# Patient Record
Sex: FEMALE | Race: BLACK OR AFRICAN AMERICAN
[De-identification: names, ages, dates, MRNs, and addresses within clinical notes are randomized per-mention and may not be internally consistent; named-entity substitution may affect disease eponyms.]

---

## 2020-08-09 ENCOUNTER — HOSPITAL ENCOUNTER (EMERGENCY)
Dept: HOSPITAL 97 - ER | Age: 40
Discharge: ANOTHER HOSPITAL | End: 2020-08-09
Payer: SELF-PAY

## 2020-08-09 VITALS — SYSTOLIC BLOOD PRESSURE: 121 MMHG | OXYGEN SATURATION: 99 % | DIASTOLIC BLOOD PRESSURE: 68 MMHG

## 2020-08-09 VITALS — TEMPERATURE: 98 F

## 2020-08-09 DIAGNOSIS — S22.49XA: ICD-10-CM

## 2020-08-09 DIAGNOSIS — Z23: ICD-10-CM

## 2020-08-09 DIAGNOSIS — J93.9: Primary | ICD-10-CM

## 2020-08-09 DIAGNOSIS — S81.812A: ICD-10-CM
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
## 2020-08-09 LAB
BUN BLD-MCNC: 13 MG/DL (ref 7–18)
GLUCOSE SERPLBLD-MCNC: 157 MG/DL (ref 74–106)
HCT VFR BLD CALC: 39.4 % (ref 36–45)
LYMPHOCYTES # SPEC AUTO: 3 K/UL (ref 0.7–4.9)
PMV BLD: 7 FL (ref 7.6–11.3)
POTASSIUM SERPL-SCNC: 4.1 MMOL/L (ref 3.5–5.1)
RBC # BLD: 4.37 M/UL (ref 3.86–4.86)

## 2020-08-09 PROCEDURE — 96360 HYDRATION IV INFUSION INIT: CPT

## 2020-08-09 PROCEDURE — 0JQP0ZZ REPAIR LEFT LOWER LEG SUBCUTANEOUS TISSUE AND FASCIA, OPEN APPROACH: ICD-10-PCS

## 2020-08-09 PROCEDURE — 72170 X-RAY EXAM OF PELVIS: CPT

## 2020-08-09 PROCEDURE — 82565 ASSAY OF CREATININE: CPT

## 2020-08-09 PROCEDURE — 70450 CT HEAD/BRAIN W/O DYE: CPT

## 2020-08-09 PROCEDURE — 99285 EMERGENCY DEPT VISIT HI MDM: CPT

## 2020-08-09 PROCEDURE — 71260 CT THORAX DX C+: CPT

## 2020-08-09 PROCEDURE — 90471 IMMUNIZATION ADMIN: CPT

## 2020-08-09 PROCEDURE — 74177 CT ABD & PELVIS W/CONTRAST: CPT

## 2020-08-09 PROCEDURE — 72125 CT NECK SPINE W/O DYE: CPT

## 2020-08-09 PROCEDURE — 71045 X-RAY EXAM CHEST 1 VIEW: CPT

## 2020-08-09 PROCEDURE — 36415 COLL VENOUS BLD VENIPUNCTURE: CPT

## 2020-08-09 PROCEDURE — 85025 COMPLETE CBC W/AUTO DIFF WBC: CPT

## 2020-08-09 PROCEDURE — 80048 BASIC METABOLIC PNL TOTAL CA: CPT

## 2020-08-09 PROCEDURE — 90714 TD VACC NO PRESV 7 YRS+ IM: CPT
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
